# Patient Record
Sex: MALE | Race: WHITE | NOT HISPANIC OR LATINO | URBAN - METROPOLITAN AREA
[De-identification: names, ages, dates, MRNs, and addresses within clinical notes are randomized per-mention and may not be internally consistent; named-entity substitution may affect disease eponyms.]

---

## 2024-05-02 ENCOUNTER — TELEPHONE (OUTPATIENT)
Dept: UROLOGY | Facility: MEDICAL CENTER | Age: 74
End: 2024-05-02

## 2024-05-02 NOTE — TELEPHONE ENCOUNTER
Message left for patient that [facility] and asked patient to call office back to schedule.

## 2024-07-17 ENCOUNTER — OFFICE VISIT (OUTPATIENT)
Dept: UROLOGY | Facility: CLINIC | Age: 74
End: 2024-07-17
Payer: COMMERCIAL

## 2024-07-17 VITALS
HEART RATE: 59 BPM | WEIGHT: 13 LBS | DIASTOLIC BLOOD PRESSURE: 70 MMHG | BODY MASS INDEX: 2.04 KG/M2 | SYSTOLIC BLOOD PRESSURE: 110 MMHG | HEIGHT: 67 IN | OXYGEN SATURATION: 98 %

## 2024-07-17 DIAGNOSIS — Z12.5 SCREENING FOR PROSTATE CANCER: ICD-10-CM

## 2024-07-17 DIAGNOSIS — N40.3 NODULAR PROSTATE WITH LOWER URINARY TRACT SYMPTOMS: Primary | ICD-10-CM

## 2024-07-17 LAB — POST-VOID RESIDUAL VOLUME, ML POC: 37 ML

## 2024-07-17 PROCEDURE — 51798 US URINE CAPACITY MEASURE: CPT | Performed by: UROLOGY

## 2024-07-17 PROCEDURE — 99203 OFFICE O/P NEW LOW 30 MIN: CPT | Performed by: UROLOGY

## 2024-07-17 RX ORDER — TAMSULOSIN HYDROCHLORIDE 0.4 MG/1
0.4 CAPSULE ORAL
COMMUNITY
Start: 2024-06-26

## 2024-07-17 NOTE — PROGRESS NOTES
7/17/2024    Benny Ahmadi  1950  [id number]    1. Nodular prostate with lower urinary tract symptoms  -     POCT Measure PVR  2. Screening for prostate cancer  Assessment & Plan:  Impression: Routine prostate cancer screening, mild BPH    Plan: Provided the patient with reassurance that his digital rectal examination is within normal limits and there is no nodularity concerning for malignancy.  In addition a recent PSA level is 3.3.  At 74 years of age this is considered within the normal range.  I do recommend that he return 1 additional time in the spring 2025 with a PSA and repeat digital rectal examination.  In the interim I agree with continuing tamsulosin.  Orders:  -     PSA, Total Screen; Future; Expected date: 04/01/2025       History of Present Illness  74 y.o. [facility].  Recent PSA level from April 2024 is 3.3.  He states he has minimal lower urinary tract symptoms which have improved since he started tamsulosin approximately 2 months ago.  He denies family history of prostate cancer.  He denies ever seeing hematuria.  PVR in the office today 37 cc.    AUA Symptom Score      Review of Systems    Past Medical History  Past Medical History:   Diagnosis Date   • BPH with obstruction/lower urinary tract symptoms    • Constipation        Past Social History  Past Surgical History:   Procedure Laterality Date   • WISDOM TOOTH EXTRACTION         Past Family History  Family History   Problem Relation Age of Onset   • Parkinsonism Mother    • Leukemia Father    • Kidney disease Brother        Past Social history  Social History     Socioeconomic History   • Marital status: Unknown     Spouse name: Not on file   • Number of children: Not on file   • Years of education: Not on file   • Highest education level: Not on file   Occupational History   • Not on file   Tobacco Use   • Smoking status: Former     Types: Cigars   • Smokeless tobacco:  "Never   Substance and Sexual Activity   • Alcohol use: Yes     Alcohol/week: 1.0 standard drink of alcohol     Types: 1 Shots of liquor per week   • Drug use: Never   • Sexual activity: Not Currently   Other Topics Concern   • Not on file   Social History Narrative   • Not on file     Social Determinants of Health     Financial Resource Strain: Not on file   Food Insecurity: Not on file   Transportation Needs: Not on file   Physical Activity: Not on file   Stress: Not on file   Social Connections: Not on file   Intimate Partner Violence: Not on file   Housing Stability: Not on file       Current Medications  Current Outpatient Medications   Medication Sig Dispense Refill   • Cholecalciferol 50 MCG (2000 UT) TABS Take 50 mcg by mouth daily     • tamsulosin (FLOMAX) 0.4 mg Take 0.4 mg by mouth daily with dinner       No current facility-administered medications for this visit.       Allergies  Allergies   Allergen Reactions   • Penicillin G Other (See Comments)   • Sulfamethoxazole-Trimethoprim Other (See Comments)     Other reaction(s): vomitting       Past Medical History, Social History, Family History, medications and allergies were reviewed.    Vitals  Vitals:    07/17/24 0809   BP: 110/70   BP Location: Left arm   Patient Position: Sitting   Cuff Size: Standard   Pulse: 59   SpO2: 98%   Weight: 5.897 kg (13 lb)   Height: 5' 7\" (1.702 m)       Physical Exam  On examination he is in no acute distress.  His abdomen is soft nontender nondistended.   examination reveals normal phallus, scrotum and scrotal contents.  There are no inguinal hernias.  Digital rectal examination reveals a benign soft 50 g prostate without nodularity.  External hemorrhoids are appreciated.  Skin is warm.  Extremities without edema.  Neurologic is grossly intact and nonfocal.  Gait normal.  Affect normal  Results  No results found for: \"PSA\"  No results found for: \"GLUCOSE\", \"CALCIUM\", \"NA\", \"K\", \"CO2\", \"CL\", \"BUN\", \"CREATININE\"  No " "results found for: \"WBC\", \"HGB\", \"HCT\", \"MCV\", \"PLT\"    Office Urine Dip  Recent Results (from the past 1 hour(s))   POCT Measure PVR    Collection Time: 07/17/24  8:25 AM   Result Value Ref Range    POST-VOID RESIDUAL VOLUME, ML POC 37 mL   ]  "

## 2024-07-17 NOTE — ASSESSMENT & PLAN NOTE
Impression: Routine prostate cancer screening, mild BPH    Plan: Provided the patient with reassurance that his digital rectal examination is within normal limits and there is no nodularity concerning for malignancy.  In addition a recent PSA level is 3.3.  At 74 years of age this is considered within the normal range.  I do recommend that he return 1 additional time in the spring 2025 with a PSA and repeat digital rectal examination.  In the interim I agree with continuing tamsulosin.

## 2024-07-17 NOTE — LETTER
July 17, 2024     [facility]  [address]  [place] [address]    Patient: Benny Ahmadi   YOB: 1950   Date of Visit: 7/17/2024       Dear Dr. Choi:    Thank you for referring Benny Ahmadi to me for evaluation. Below are my notes for this consultation.    If you have questions, please do not hesitate to call me. I look forward to following your patient along with you.         Sincerely,        All Sorenson MD        CC: MD All Dumont MD  7/17/2024  9:01 AM  Sign when Signing Visit  7/17/2024    Benny Ahmadi  1950  [id number]    1. Nodular prostate with lower urinary tract symptoms  -     POCT Measure PVR  2. Screening for prostate cancer  Assessment & Plan:  Impression: Routine prostate cancer screening, mild BPH    Plan: Provided the patient with reassurance that his digital rectal examination is within normal limits and there is no nodularity concerning for malignancy.  In addition a recent PSA level is 3.3.  At 74 years of age this is considered within the normal range.  I do recommend that he return 1 additional time in the spring 2025 with a PSA and repeat digital rectal examination.  In the interim I agree with continuing tamsulosin.  Orders:  -     PSA, Total Screen; Future; Expected date: 04/01/2025       History of Present Illness  74 y.[facility]. [facility].  Recent PSA level from April 2024 is 3.3.  He states he has minimal lower urinary tract symptoms which have improved since he started tamsulosin approximately 2 months ago.  He denies family history of prostate cancer.  He denies ever seeing hematuria.  PVR in the office today 37 cc.    AUA Symptom Score      Review of Systems    Past Medical History  Past Medical History:   Diagnosis Date   • BPH with obstruction/lower urinary tract symptoms    • Constipation        Past Social History  Past  "Surgical History:   Procedure Laterality Date   • WISDOM TOOTH EXTRACTION         Past Family History  Family History   Problem Relation Age of Onset   • Parkinsonism Mother    • Leukemia Father    • Kidney disease Brother        Past Social history  Social History     Socioeconomic History   • Marital status: Unknown     Spouse name: Not on file   • Number of children: Not on file   • Years of education: Not on file   • Highest education level: Not on file   Occupational History   • Not on file   Tobacco Use   • Smoking status: Former     Types: Cigars   • Smokeless tobacco: Never   Substance and Sexual Activity   • Alcohol use: Yes     Alcohol/week: 1.0 standard drink of alcohol     Types: 1 Shots of liquor per week   • Drug use: Never   • Sexual activity: Not Currently   Other Topics Concern   • Not on file   Social History Narrative   • Not on file     Social Determinants of Health     Financial Resource Strain: Not on file   Food Insecurity: Not on file   Transportation Needs: Not on file   Physical Activity: Not on file   Stress: Not on file   Social Connections: Not on file   Intimate Partner Violence: Not on file   Housing Stability: Not on file       Current Medications  Current Outpatient Medications   Medication Sig Dispense Refill   • Cholecalciferol 50 MCG (2000 UT) TABS Take 50 mcg by mouth daily     • tamsulosin (FLOMAX) 0.4 mg Take 0.4 mg by mouth daily with dinner       No current facility-administered medications for this visit.       Allergies  Allergies   Allergen Reactions   • Penicillin G Other (See Comments)   • Sulfamethoxazole-Trimethoprim Other (See Comments)     Other reaction(s): vomitting       Past Medical History, Social History, Family History, medications and allergies were reviewed.    Vitals  Vitals:    07/17/24 0809   BP: 110/70   BP Location: Left arm   Patient Position: Sitting   Cuff Size: Standard   Pulse: 59   SpO2: 98%   Weight: 5.897 kg (13 lb)   Height: 5' 7\" (1.702 m) " "      Physical Exam  On examination he is in no acute distress.  His abdomen is soft nontender nondistended.   examination reveals normal phallus, scrotum and scrotal contents.  There are no inguinal hernias.  Digital rectal examination reveals a benign soft 50 g prostate without nodularity.  External hemorrhoids are appreciated.  Skin is warm.  Extremities without edema.  Neurologic is grossly intact and nonfocal.  Gait normal.  Affect normal  Results  No results found for: \"PSA\"  No results found for: \"GLUCOSE\", \"CALCIUM\", \"NA\", \"K\", \"CO2\", \"CL\", \"BUN\", \"CREATININE\"  No results found for: \"WBC\", \"HGB\", \"HCT\", \"MCV\", \"PLT\"    Office Urine Dip  Recent Results (from the past 1 hour(s))   POCT Measure PVR    Collection Time: 07/17/24  8:25 AM   Result Value Ref Range    POST-VOID RESIDUAL VOLUME, ML POC 37 mL   ]    "

## 2024-08-16 PROBLEM — Z12.5 SCREENING FOR PROSTATE CANCER: Status: RESOLVED | Noted: 2024-07-17 | Resolved: 2024-08-16

## 2024-12-17 ENCOUNTER — TELEPHONE (OUTPATIENT)
Age: 74
End: 2024-12-17

## 2024-12-17 NOTE — TELEPHONE ENCOUNTER
Patient was calling to reschedule his May 1st appt. He needs the end of June of beginning of July/.     Patient selected 6/24 10:40 am    Patient has been rescheduled.

## 2025-07-18 ENCOUNTER — TELEPHONE (OUTPATIENT)
Dept: UROLOGY | Facility: CLINIC | Age: 75
End: 2025-07-18

## 2025-07-18 NOTE — TELEPHONE ENCOUNTER
Message left for patient in regards to his appointment with Amarilys on 8/1st at 9:40 being canceled due to [facility]il[facility] rounding at hospital.  Rescheduled patient with Amarilys on 8/28 at 10:00.  Asked patient to call back to confirm change.

## 2025-07-28 NOTE — TELEPHONE ENCOUNTER
2nd message left for in regards to 8/1 appointment being canceled and rescheduled to 8/28 at 10:00.  Asked patient to call office to let us know he is aware of this change.

## 2025-08-01 NOTE — TELEPHONE ENCOUNTER
Patient did not return call in regards to his appointment being changed.  Patient showed up for appointment.  I explained to him that we called him twice and left messages in regards to this for him to call back.  Patient upset and stated that he has a lot things going on at the moment.  Patient stated he could not do the 8/28 appointment with Amarilys.  Patient [facility] which [facility] 2.19.  Patient did not want to reschedule his appointment until 6/2026.